# Patient Record
Sex: FEMALE | Race: BLACK OR AFRICAN AMERICAN | NOT HISPANIC OR LATINO | Employment: STUDENT | URBAN - METROPOLITAN AREA
[De-identification: names, ages, dates, MRNs, and addresses within clinical notes are randomized per-mention and may not be internally consistent; named-entity substitution may affect disease eponyms.]

---

## 2023-04-27 LAB
ALANINE AMINOTRANSFERASE (SGPT) (U/L) IN SER/PLAS: 16 U/L (ref 7–45)
ALBUMIN (G/DL) IN SER/PLAS: 4.4 G/DL (ref 3.4–5)
ALKALINE PHOSPHATASE (U/L) IN SER/PLAS: 33 U/L (ref 33–110)
ASPARTATE AMINOTRANSFERASE (SGOT) (U/L) IN SER/PLAS: 20 U/L (ref 9–39)
BILIRUBIN DIRECT (MG/DL) IN SER/PLAS: 0.1 MG/DL (ref 0–0.3)
BILIRUBIN TOTAL (MG/DL) IN SER/PLAS: 0.4 MG/DL (ref 0–1.2)
CALCIDIOL (25 OH VITAMIN D3) (NG/ML) IN SER/PLAS: 10 NG/ML
PROTEIN TOTAL: 7.5 G/DL (ref 6.4–8.2)
THYROTROPIN (MIU/L) IN SER/PLAS BY DETECTION LIMIT <= 0.05 MIU/L: 0.81 MIU/L (ref 0.44–3.98)
THYROXINE (T4) FREE (NG/DL) IN SER/PLAS: 1 NG/DL (ref 0.78–1.48)
TRIIODOTHYRONINE (T3) (NG/DL) IN SER/PLAS: 142 NG/DL (ref 60–200)

## 2023-04-28 LAB — PARATHYRIN INTACT (PG/ML) IN SER/PLAS: 29.1 PG/ML (ref 18.5–88)

## 2023-05-02 LAB — THYROID STIMULATING IMMUNOGLOBULIN: <1 TSI INDEX

## 2023-07-21 ENCOUNTER — HOSPITAL ENCOUNTER (OUTPATIENT)
Dept: DATA CONVERSION | Facility: HOSPITAL | Age: 28
End: 2023-07-23
Attending: OTOLARYNGOLOGY | Admitting: OTOLARYNGOLOGY
Payer: COMMERCIAL

## 2023-07-21 DIAGNOSIS — E04.9 NONTOXIC GOITER, UNSPECIFIED: ICD-10-CM

## 2023-07-21 DIAGNOSIS — E04.8 OTHER SPECIFIED NONTOXIC GOITER: ICD-10-CM

## 2023-07-21 LAB
ALBUMIN (G/DL) IN SER/PLAS: 4.2 G/DL (ref 3.4–5)
ANION GAP IN SER/PLAS: 17 MMOL/L (ref 10–20)
CALCIUM (MG/DL) IN SER/PLAS: 9.1 MG/DL (ref 8.6–10.6)
CARBON DIOXIDE, TOTAL (MMOL/L) IN SER/PLAS: 21 MMOL/L (ref 21–32)
CHLORIDE (MMOL/L) IN SER/PLAS: 103 MMOL/L (ref 98–107)
CREATININE (MG/DL) IN SER/PLAS: 0.63 MG/DL (ref 0.5–1.05)
GFR FEMALE: >90 ML/MIN/1.73M2
GLUCOSE (MG/DL) IN SER/PLAS: 144 MG/DL (ref 74–99)
PARATHYRIN INTACT (PG/ML) IN SER/PLAS: 46.4 PG/ML (ref 18.5–88)
PHOSPHATE (MG/DL) IN SER/PLAS: 4.3 MG/DL (ref 2.5–4.9)
POTASSIUM (MMOL/L) IN SER/PLAS: 4 MMOL/L (ref 3.5–5.3)
SODIUM (MMOL/L) IN SER/PLAS: 137 MMOL/L (ref 136–145)
UREA NITROGEN (MG/DL) IN SER/PLAS: 7 MG/DL (ref 6–23)

## 2023-07-22 LAB
ALBUMIN (G/DL) IN SER/PLAS: 3.8 G/DL (ref 3.4–5)
ANION GAP IN SER/PLAS: 14 MMOL/L (ref 10–20)
CALCIUM (MG/DL) IN SER/PLAS: 9 MG/DL (ref 8.6–10.6)
CARBON DIOXIDE, TOTAL (MMOL/L) IN SER/PLAS: 25 MMOL/L (ref 21–32)
CHLORIDE (MMOL/L) IN SER/PLAS: 104 MMOL/L (ref 98–107)
CREATININE (MG/DL) IN SER/PLAS: 0.59 MG/DL (ref 0.5–1.05)
ERYTHROCYTE DISTRIBUTION WIDTH (RATIO) BY AUTOMATED COUNT: 14.6 % (ref 11.5–14.5)
ERYTHROCYTE MEAN CORPUSCULAR HEMOGLOBIN CONCENTRATION (G/DL) BY AUTOMATED: 32.3 G/DL (ref 32–36)
ERYTHROCYTE MEAN CORPUSCULAR VOLUME (FL) BY AUTOMATED COUNT: 85 FL (ref 80–100)
ERYTHROCYTES (10*6/UL) IN BLOOD BY AUTOMATED COUNT: 4.34 X10E12/L (ref 4–5.2)
GFR FEMALE: >90 ML/MIN/1.73M2
GLUCOSE (MG/DL) IN SER/PLAS: 110 MG/DL (ref 74–99)
HEMATOCRIT (%) IN BLOOD BY AUTOMATED COUNT: 36.8 % (ref 36–46)
HEMOGLOBIN (G/DL) IN BLOOD: 11.9 G/DL (ref 12–16)
LEUKOCYTES (10*3/UL) IN BLOOD BY AUTOMATED COUNT: 9.2 X10E9/L (ref 4.4–11.3)
MAGNESIUM (MG/DL) IN SER/PLAS: 1.86 MG/DL (ref 1.6–2.4)
NRBC (PER 100 WBCS) BY AUTOMATED COUNT: 0 /100 WBC (ref 0–0)
PHOSPHATE (MG/DL) IN SER/PLAS: 3.3 MG/DL (ref 2.5–4.9)
PLATELETS (10*3/UL) IN BLOOD AUTOMATED COUNT: 303 X10E9/L (ref 150–450)
POTASSIUM (MMOL/L) IN SER/PLAS: 3.7 MMOL/L (ref 3.5–5.3)
SODIUM (MMOL/L) IN SER/PLAS: 139 MMOL/L (ref 136–145)
UREA NITROGEN (MG/DL) IN SER/PLAS: 6 MG/DL (ref 6–23)

## 2023-08-15 LAB
COMPLETE PATHOLOGY REPORT: NORMAL
CONVERTED CLINICAL DIAGNOSIS-HISTORY: NORMAL
CONVERTED FINAL DIAGNOSIS: NORMAL
CONVERTED FINAL REPORT PDF LINK TO COPY AND PASTE: NORMAL
CONVERTED GROSS DESCRIPTION: NORMAL

## 2023-09-29 VITALS — WEIGHT: 152.12 LBS | BODY MASS INDEX: 26.95 KG/M2 | HEIGHT: 63 IN

## 2023-09-30 NOTE — H&P
History of Present Illness:   Pregnant/Lactating:  ·  Are You Pregnant no   ·  Are You Currently Breastfeeding no     History Present Illness:  Reason for surgery: thyroid goiter   HPI:     large goiter  longstanding  ready to have it removed  Lengthy and detailed discussion was held with the patient regarding risks benefits and alternatives the thyroidectomy including the  risks to the recurrent laryngeal nerve to be either unilateral or bilateral partial or complete temporary or permanent and the  difference between unilateral paralysis as it relates to hoarseness and bilateral paralysis as it relates to potential breathing problems  and rare need for tracheotomy, also discussed hypocalcemia temporary or permanent basis as well as need for thyroid replacement  bleeding infection other perioperative risks including death based on medical comorbidities etc. patient expressed an understanding  consent has been obtained  discussed the surgical incisions and potential for scarring  [place] [facility] [place]inology  has seen endo  have discussed her anxiety and will talk with her family md to assist preop with  verbal consent obtained  will obtain written consent the day of surgery    Allergies:        Allergies:  ·  NKDA :   ·  Shell  Fish: Itching    Home Medication Review:   Home Medications Reviewed: yes     Impression/Procedure:   ·  Impression and Planned Procedure: large goiter plan is for total thyroidectomy       ERAS (Enhanced Recovery After Surgery):  ·  ERAS Patient: no       Physical Exam by System:    Constitutional: Well developed, awake/alert/oriented  x3, no distress, alert and cooperative   Eyes: PERRL, EOMI, clear sclera   ENMT: mucous membranes moist, no apparent injury,  no lesions seen   Head/Neck: Neck supple, no apparent injury, thyroid  without mass or tenderness, No JVD, trachea midline, no bruits   Respiratory/Thorax: Patent airways, CTAB, normal  breath sounds with good chest  expansion, thorax symmetric   Cardiovascular: Regular, rate and rhythm, no murmurs,  2+ equal pulses of the extremities, normal S 1and S 2   Musculoskeletal: ROM intact, no joint swelling, normal  strength   Extremities: normal extremities, no cyanosis edema,  contusions or wounds, no clubbing   Neurological: alert and oriented x3, intact senses,  motor, response and reflexes, normal strength   Lymphatic: No significant lymphadenopathy   Psychological: Appropriate mood and behavior   Skin: Warm and dry, no lesions, no rashes     Consent:   COVID-19 Consent:  ·  COVID-19 Risk Consent Surgeon has reviewed key risks related to the risk of frank COVID-19 and if they contract COVID-19 what the risks are.     Attestation:   Note Completion:  I am a:  Resident/Fellow   Attending Attestation I saw and evaluated the patient.  I personally obtained the key and critical portions of the history and physical exam or was physically present for key and  critical portions performed by the resident/fellow. I reviewed the resident/fellow?s documentation and discussed the patient with the resident/fellow.  I agree with the resident/fellow?s medical decision making as documented in the note.     I personally evaluated the patient on 21-Jul-2023         Electronic Signatures:  Darron Murillo (DO (Resident))  (Signed 21-Jul-2023 05:28)   Authored: History of Present Illness, Allergies, Home  Medication Review, Impression/Procedure, ERAS, Physical Exam, Consent, Note Completion  Paul Valdez)  (Signed 21-Jul-2023 12:22)   Authored: Note Completion   Co-Signer: History of Present Illness, Allergies, Home Medication Review, Impression/Procedure, ERAS, Physical Exam, Consent, Note Completion      Last Updated: 21-Jul-2023 12:22 by Paul Valdez)

## 2023-09-30 NOTE — PROGRESS NOTES
Service: ENT     Subjective Data:   ALBERTO TELLEZ is a 27 year old Female who is Hospital Day # 3 and POD #2 for 1. total thyroidectomy ;2. parathyroid reimplantation.    Additional Information:    Subjective:  No acute events overnight    Objective:  Vitals reviewed  General: Alert,no acute distress  Resp: Breathing comfortably on room air, no stridor  Head: Atraumatic, normocephalic  Oral Cavity: MMM  Ears: Normal external ears  Nose: Normal external nose  Neck: Incision c/d/i, no evidence of fluid collection, drain in place, serosanguinous, steri strips    Assessment:  28 yo F with PMH of large multinodular goiter s/p total thyroidectomy with parathyroid reimplantation 7/21 with Dr. Valdez. PACU PTH 46.4.    Active Issues:  anxiety  s/p thyroidectomy    Plan:  ENT: f/u drain output  Neuro/Pain: tylenol, oxy PRN  CV: q4 VS  Pulm: q4 VS  GI: regular diet, bowel reg  : no ind for rodriguez  Heme:  f/u H/H  ID: no ind for abx  Endo: f/u BG and AM labs  MSK: OOB ad kate  Dispo:  pending drain    Dept. of Otolaryngology - Head and Neck Surgery  ENT Adult: [id number]  ENT Phone: [phone]  ENT Peds: [phone]  ENT Outpatient scheduling number: [phone]     Objective Data:     Objective Information:      T   P  R  BP   MAP  SpO2   Value  36.9  77  18  130/88      95%  Date/Time 7/23 5:14 7/23 5:14 7/23 5:14 7/23 5:14    7/23 5:14  Range  (36.2C - 37C )  (76 - 100 )  (16 - 18 )  (117 - 130 )/ (77 - 88 )    (94% - 98% )  Highest temp of 37 C was recorded at 7/23 1:23      Pain reported at 7/23 5:02: 8 = Severe    ---- Intake and Output  -----  Mn/Dy/Year Time  Intake   Output  Net  Jul 23, 2023 6:00 am  0   825  -825  Jul 22, 2023 10:00 pm  325   802.5  -478  Jul 22, 2023 2:00 pm  0   15  -15    The Intake and Output Totals for the last 24 hours are:      Intake   Output  Net      325   1642  -1318    Recent Lab Results:    Results:        RFP: 7/21/2023 19:24  NA+        Cl-     BUN  /                         137     103    7  /  --------------------------------  Glucose                ---------------------------  144 H    K+     HCO3-   Creat \                         4.0    21    0.63  \  Calcium : 9.1Anion Gap : 17          Albumin : 4.2     Phos : 4.3      Assessment and Plan:   Code Status:  ·  Code Status Full Code     Attestation:   Note Completion:  I am a:  Resident/Fellow   Attending Attestation I saw and evaluated the patient.  I personally obtained the key and critical portions of the history and physical exam or was physically present for key and  critical portions performed by the resident/fellow. I reviewed the resident/fellow?s documentation and discussed the patient with the resident/fellow.  I agree with the resident/fellow?s medical decision making as documented in the note.     I personally evaluated the patient on 23-Jul-2023         Electronic Signatures:  Ryan Wang (Resident))  (Signed 23-Jul-2023 08:38)   Authored: Service, Subjective Data, Objective Data, Assessment  and Plan, Note Completion  Jean Goldsmith)  (Signed 26-Jul-2023 09:31)   Authored: Note Completion   Co-Signer: Service, Subjective Data, Objective Data, Assessment and Plan, Note Completion      Last Updated: 26-Jul-2023 09:31 by Jean Goldsmith)

## 2023-09-30 NOTE — DISCHARGE SUMMARY
Send Summary:   Discharge Summary Providers:  Provider Role Provider Name   · Attending Paul Valdez   · Referring Paul Valdez   · Primary Dara Gupta       Note Recipients: Dara Gupta MD Rezaee, Rod, MD - [phone] [preferred]       Discharge:    Summary:   Admission Date: .21-Jul-2023 10:46:00   Discharge Date: 23-Jul-2023   Attending Physician at Discharge: Paul Valdez   Admission Reason: goiter   Final Discharge Diagnoses: Goiter   Procedures: Date: 21-Jul-2023 18:32:00  Procedure Name: 1. total thyroidectomy   2. parathyroid reimplantation   Condition at Discharge: Satisfactory   Disposition at Discharge: .Home   Hospital Course:    Les is a 28 yo F who underwent total thyroidectomy on 7/21/23 with Dr. Valdez. Please see operative report for full details. They tolerated the procedure well  and was admitted to the regular nursing floor post-op. The post-op PTH was 46 and Ca 9.1. Pain was well controlled on oral pain medication and patient was tolerated regular diet. The drain was monitored and removed prior to discharge. At this time, patient  was determined to be safe for discharge home with outpatient follow-up.       Discharge Information:    and Continuing Care:   Lab Results - Pending:    Surgical Pathology Drawn at 21-Jul-2023 16:51:00  Radiology Results - Pending: None   Discharge Instructions:    Activity:           activity as tolerated.          May shower..            May not return to school/work until follow-up visit with.  Dr. Valdez            May not drive while taking narcotics.            No pushing, pulling, or lifting objects greater than 10 pounds.      Nutrition/Diet:           regular    Additional Orders:           Additional Instructions:   Monitor for signs of low calcium  ?Tingling in your hands, feet, or lips ?Muscle spasms, weakness, shaking, or loss of body control ?Seizures ?Slow or uneven heartbeat, lightheadedness ?Anxiety,  depression, anger, confusion, or seeing or hearing things  that are not really there     if you develop these symptoms - take 2-3 TUMS tablets and call Dr. melendrez's office during business hours or go to nearest ER.    Follow Up Appointments:    Follow-Up Appointment 01:           Physician/Dept/Service:   Dr. Melendrez          Reason for Referral:   post op appointment          Scheduled Date/Time:   01-Aug-2023 13:45          Location:   [facility] [address]  [address], [place] [address]          Phone Number:   [phone] with questions    Discharge Medications: Home Medication   NuvaRing 0.12 mg-0.015 mg/24 hours vaginal ring - 1 each vaginal every 4 weeks  levothyroxine 112 mcg (0.112 mg) oral tablet - 1 tab(s) orally once a day - take on empty stomach at least 1 hour before breakfast  oxyCODONE 5 mg/5 mL oral solution - 5 milliliter(s) orally every 6 hours   Senna S 50 mg-8.6 mg oral tablet - 2 tab(s) orally once a day (at bedtime) while taking naroctics   citalopram 20 mg oral tablet - 1 tab(s) oral once a day  Vitamin D3 1250 mcg oral capsule - 1 cap(s) oral once a week     PRN Medication   LORazepam 0.5 mg oral tablet - 1 tab(s) orally once a day, As Needed - for anxiety    Last filled 5/6/22 for 10 days.  loratadine 10 mg oral tablet - 1 tab(s) orally once a day, As Needed - for allergy symptoms     DNR Status:   ·  Code Status Code Status order at time of discharge: Full Code     Attestation:   Note Completion:  I am a:  Resident/Fellow   Attending Attestation I reviewed the resident/fellow?s documentation and discussed the patient with the resident/fellow.  I agree with the resident/fellow?s medical  decision making as documented in the note.          Electronic Signatures:  Ryan Wang (Resident))  (Signed 23-Jul-2023 09:19)   Authored: Send Summary, Summary Content, Ongoing Care,  DNR Status, Note Completion  Paul Melendrez)  (Signed 23-Jul-2023  12:06)   Authored: Note Completion   Co-Signer: Send Summary, Summary Content, Ongoing Care, DNR Status, Note Completion      Last Updated: 23-Jul-2023 12:06 by Paul Valdez)

## 2023-09-30 NOTE — PROGRESS NOTES
Service: ENT     Subjective Data:   ALBERTO TELLEZ is a 27 year old Female who is Hospital Day # 2 and POD #1 for 1. total thyroidectomy ;2. parathyroid reimplantation.    Additional Information:    Subjective:  No acute events overnight  Patient sluggish, feels pain is adequately managed    Objective:  Vitals reviewed  General: Alert,no acute distress  Resp: Breathing comfortably on room air, no stridor  Head: Atraumatic, normocephalic  Oral Cavity: MMM  Ears: Normal external ears  Nose: Normal external nose  Neck: Incision c/d/i, no evidence of fluid collection, drain in place, serosanguinous, steri strips    Assessment:  26 yo F with PMH of large multinodular goiter s/p total thyroidectomy with parathyroid reimplantation 7/21 with Dr. Valdez. PACU PTH 46.4.    Active Issues:  anxiety  s/p thyroidectomy    Plan:  ENT: f/u drain output  Neuro/Pain: tylenol, oxy PRN  CV: q4 VS  Pulm: q4 VS  GI: regular diet, bowel reg  : no ind for rodriguez  Heme:  f/u H/H  ID: no ind for abx  Endo: f/u BG and AM labs  MSK: OOB ad kate  Dispo:  pending drain      Dept. of Otolaryngology - Head and Neck Surgery  ENT Adult: [id number]  ENT Phone: [phone]  ENT Peds: [phone]  ENT Outpatient scheduling number: [phone]     Objective Data:     Objective Information:      T   P  R  BP   MAP  SpO2   Value  36.6  89  18  122/79      93%  Date/Time 7/21 22:19 7/21 22:19 7/21 22:19 7/21 22:19    7/21 22:19  Range  (36.6C - 36.6C )  (89 - 89 )  (18 - 18 )  (122 - 122 )/ (79 - 79 )    (93% - 93% )      Pain reported at 7/21 21:45: sleeping    Recent Lab Results:    Results:        RFP: 7/21/2023 19:24  NA+        Cl-     BUN  /                         137    103    7  /  --------------------------------  Glucose                ---------------------------  144 H    K+     HCO3-   Creat \                         4.0    21    0.63  \  Calcium : 9.1Anion Gap : 17          Albumin : 4.2     Phos : 4.3      Assessment and Plan:   Code  Status:  ·  Code Status Full Code     Attestation:   Note Completion:  I am a:  Resident/Fellow   Attending Attestation I saw and evaluated the patient.  I personally obtained the key and critical portions of the history and physical exam or was physically present for key and  critical portions performed by the resident/fellow. I reviewed the resident/fellow?s documentation and discussed the patient with the resident/fellow.  I agree with the resident/fellow?s medical decision making as documented in the note.     I personally evaluated the patient on 22-Jul-2023         Electronic Signatures:  Kip Edwards)  (Signed 23-Jul-2023 10:01)   Authored: Note Completion   Co-Signer: Service, Subjective Data, Objective Data, Assessment and Plan, Note Completion  Sanket De Jesus (MD (Resident))  (Signed 22-Jul-2023 00:50)   Authored: Service, Subjective Data, Objective Data, Assessment  and Plan, Note Completion  Marvel Dyson (Resident))  (Signed 23-Jul-2023 00:07)   Entered: Subjective Data   Authored: Service, Subjective Data, Objective Data, Assessment and Plan, Note Completion      Last Updated: 23-Jul-2023 10:01 by Kip Edwards)

## 2023-10-02 NOTE — OP NOTE
PROCEDURE DETAILS    Preoperative Diagnosis:  substernal goiter   Postoperative Diagnosis:  substernal goiter   Surgeon: Courtney   Resident/Fellow/Other Assistant: Hipolito    Procedure:  1. total thyroidectomy   2. parathyroid reimplantation   Anesthesia: general  Estimated Blood Loss: 35 cc   Findings: large goiter with primarily anterior extension  b/l RLN identified and stimulated at end of case   Specimens(s) Collected: yes,  thyroid lobe    Complications: none   Drains and/or Catheters: 1 TIM in right neck  Patient Returned To/Condition: pacu/stable         Operative Report:   Indications:  Patient is a 28 yo F who has been following us in clinic for quite some time for a large thyroid goiter. Her anxiety has been limiting her decision to pursue surgery. However, in the recent months the goiter has continued to enlarge and now is causing  significant symptomatic burden including dysphagia, limitation in neck motion, and inability to lay fat 2/2 to pressure. Given the progressive nature of symptoms and after mangement of anxiety with her PCP, she was agreeable to undergoing thyroidectomy.  Extensive discussions had with patient and her mother regarding the risks, benefits, and alternatives to the procedures in clinic on multiple occasions. Risks reviewed are but not limited to bleeding, infection, injury (permanent or temporary) to the  RLN, hypoparathyroidism, voice changes. She was understanding and agreeable to the procedure and risks, she signed informed consent.     Description of Procedure:  Patient was seen and evaluated in the pre-operative area. Informed consent was obtained as described above. Patient was taken back to the operating room by the anesthesia team. General anesthesia was induced and patient was orotracheally intubated using  a Glidescope and a nerve monitoring tube. Appropriate position was confirmed by anesthesia and ENT. The nerve monitor was hooked up and confirmed to be working  appropriately. This was used throughout the case to identify and ensure function of the bilateral  recurrent laryngeal nerves. Pre-operative huddle was performed by Dr. Valdez .    The bulk of the goiter was extending anteriorly. We planned a 9-10 cm incision low in the midline neck, injected this with 1% 1:000,000 lidocaine with epinephrine. She was then prepped and draped in appropriate fashion with a shoulder roll in place. A  #15 blade was used to incise the skin. Electrocautery was used to incise down to the anterior jugular veins.  Subplatysmal flaps were elevated superiorly and inferiorly. The strap muscles were identified, noted to be significantly thinned due to the goiter,  and , elevated, and divided bilaterally. Retractors were placed. We immediately encountered the goiter comprising the left thyroid lobe and isthmus. We then proceeded with a capsular dissection around the entirety of the goiter freeing the bulk  of the tissue from the fascia and surrounding soft tissue. We then moved medially and identified the trachea superiorly and inferiorly  which had been deviated to the right. The isthmus was identified however the goiter was comprising the majority of  the isthmus. We were then able to deliver the bulk of the goiter from the neck incision improving visibility of the deep neck. We then mobilized the superior pole vessels and isolated and clipped the vessels. A Ora was placed on the superior pole  being careful not to rupture any nodules/masses, as we mobilized the superior pole further. It was retracted down and all the soft tissue down to the joint space was freed. Laterally, the middle thyroid vein was clipped and inferiorly we identified the  vessels and these were clipped as well. We identified the left RLN at the joint space given the large goiter and risk of displacement. We traced the Left RLN inferiorly and released the thyroid gland from the RLN until the gland was swept away  from the  nerve. Elias's ligament was divided. The RLN was then stimulated appropriately.     We then turned our attention to the contralateral side. The strep muscles had already been divided. We again performed a capsular dissection around the gland and swept the fascia away freeing the gland from the soft tissue. We then mobilized the superior  pole. Vessels were identified, isolated, clipped, and cut. A [place] was placed on the superior pole being careful not to rupture any nodules/masses, as we mobilized the superior pole further. It was retracted down and all the soft tissue down to the  joint space was freed. We then identified the lateral aspect of the gland and the middle thyroid vein which was isolated, clipped, and cut. The inferior pole vessels were isolated, clipped, and cut. The fascia was swept off the gland. The trachea had  already been identified. We again found the right RLN at the joint space and followed this inferiorly dissecting the gland off of the RLN until it was freed. The right RLN stimulated at the end of the dissection. At this point, the tracheal attachments  of the isthmus were the only remaining aspect and this was freed. The specimen was removed, oriented, and sent for permanent pathology.     We did identify the right superior parathyroid gland, it appeared slightly devascularized therefore we moved forward with reimplantation. The gland was removed, minced with a Metzenbaum scissors and reimplanted into a pocket created in the bulk of the  R SCM muscle. The muscle was reapproximated with a 3-0 vicryl suture.     We then performed extensive irrigation and hemostasis. bilateral nerves were stimulated again. We placed a single 10-flat drain and secured this with a 2-0 prolene suture. WE turned our attention to closure. The straps were re-approximated. The platysma  was closed with 3-0 vicryl, and then the deep dermal layer with 3-0 vicryl and a running subcuticular with 4-0 monocryl  for the skin. Mastisol and Steri-Strips were placed. Patient's care was then turned over to the anesthesia team and they were awaken,  extubated and transported to the PACU in stable condition.    Dr. Valdez was present and participated in the critical portions of the procedure..                        Attestation:   Note Completion:  Attending Attestation I was present for the entire procedure    I am a: Resident/Fellow         Electronic Signatures:  Cathy Mcmillan (Resident))  (Signed 23-Jul-2023 10:52)   Authored: Post-Operative Note, Chart Review, Note Completion  Paul Valdez)  (Signed 23-Jul-2023 12:06)   Authored: Note Completion   Co-Signer: Post-Operative Note, Chart Review, Note Completion      Last Updated: 23-Jul-2023 12:06 by Paul Valdez)

## 2023-11-06 PROBLEM — E07.9 THYROID DISORDER: Status: ACTIVE | Noted: 2023-11-06

## 2023-11-06 PROBLEM — L83 ACANTHOSIS NIGRICANS: Status: ACTIVE | Noted: 2023-11-06

## 2023-11-06 PROBLEM — R13.10 DYSPHAGIA: Status: ACTIVE | Noted: 2023-11-06

## 2023-11-06 PROBLEM — N63.0 BREAST MASS IN FEMALE: Status: ACTIVE | Noted: 2023-11-06

## 2023-11-06 PROBLEM — E55.9 VITAMIN D DEFICIENCY: Status: ACTIVE | Noted: 2023-11-06

## 2023-11-06 PROBLEM — E04.9 GOITER: Status: ACTIVE | Noted: 2023-11-06

## 2023-11-06 PROBLEM — R79.89 LOW TSH LEVEL: Status: ACTIVE | Noted: 2023-11-06

## 2023-11-06 PROBLEM — M54.2 CERVICALGIA: Status: ACTIVE | Noted: 2023-11-06

## 2023-11-06 PROBLEM — F41.9 ANXIETY: Status: ACTIVE | Noted: 2023-11-06

## 2023-11-06 RX ORDER — LORATADINE 10 MG/1
1 TABLET ORAL DAILY PRN
COMMUNITY
Start: 2023-04-27

## 2023-11-06 RX ORDER — CYCLOBENZAPRINE HCL 5 MG
1 TABLET ORAL 3 TIMES DAILY
COMMUNITY
Start: 2023-07-25

## 2023-11-06 RX ORDER — TRAMADOL HYDROCHLORIDE 50 MG/1
50-100 TABLET ORAL EVERY 4 HOURS PRN
COMMUNITY
Start: 2023-07-25

## 2023-11-06 RX ORDER — LORAZEPAM 0.5 MG/1
1 TABLET ORAL DAILY PRN
COMMUNITY
Start: 2022-05-02

## 2023-11-06 RX ORDER — CITALOPRAM 20 MG/1
1 TABLET, FILM COATED ORAL DAILY
COMMUNITY
Start: 2022-05-06

## 2023-11-06 RX ORDER — HYDROXYZINE PAMOATE 25 MG/1
1 CAPSULE ORAL 3 TIMES DAILY PRN
COMMUNITY
Start: 2019-10-22

## 2023-11-06 RX ORDER — ETONOGESTREL AND ETHINYL ESTRADIOL .12; .015 MG/D; MG/D
RING VAGINAL
COMMUNITY
Start: 2023-06-15

## 2023-11-06 RX ORDER — ACETAMINOPHEN 500 MG
2000 TABLET ORAL DAILY
COMMUNITY
Start: 2023-05-03

## 2023-11-06 RX ORDER — ERGOCALCIFEROL 1.25 MG/1
50000 CAPSULE ORAL
COMMUNITY
Start: 2023-05-03

## 2024-02-05 ENCOUNTER — OFFICE VISIT (OUTPATIENT)
Dept: PRIMARY CARE | Facility: CLINIC | Age: 29
End: 2024-02-05
Payer: COMMERCIAL

## 2024-02-05 ENCOUNTER — APPOINTMENT (OUTPATIENT)
Dept: PRIMARY CARE | Facility: CLINIC | Age: 29
End: 2024-02-05
Payer: COMMERCIAL

## 2024-02-05 ENCOUNTER — LAB (OUTPATIENT)
Dept: LAB | Facility: LAB | Age: 29
End: 2024-02-05
Payer: COMMERCIAL

## 2024-02-05 VITALS
SYSTOLIC BLOOD PRESSURE: 112 MMHG | BODY MASS INDEX: 25.9 KG/M2 | TEMPERATURE: 98 F | WEIGHT: 145.6 LBS | DIASTOLIC BLOOD PRESSURE: 76 MMHG | HEART RATE: 76 BPM

## 2024-02-05 DIAGNOSIS — Z13.220 SCREENING CHOLESTEROL LEVEL: ICD-10-CM

## 2024-02-05 DIAGNOSIS — Z13.1 DIABETES MELLITUS SCREENING: ICD-10-CM

## 2024-02-05 DIAGNOSIS — E04.9 GOITER: ICD-10-CM

## 2024-02-05 DIAGNOSIS — Z23 IMMUNIZATION DUE: ICD-10-CM

## 2024-02-05 DIAGNOSIS — Z11.4 ENCOUNTER FOR SCREENING FOR HIV: ICD-10-CM

## 2024-02-05 DIAGNOSIS — Z00.00 HEALTH CARE MAINTENANCE: ICD-10-CM

## 2024-02-05 DIAGNOSIS — Z11.59 NEED FOR HEPATITIS C SCREENING TEST: ICD-10-CM

## 2024-02-05 DIAGNOSIS — Z00.00 HEALTH CARE MAINTENANCE: Primary | ICD-10-CM

## 2024-02-05 LAB
ERYTHROCYTE [DISTWIDTH] IN BLOOD BY AUTOMATED COUNT: 14.8 % (ref 11.5–14.5)
HCT VFR BLD AUTO: 39.5 % (ref 36–46)
HGB BLD-MCNC: 13 G/DL (ref 12–16)
MCH RBC QN AUTO: 28.2 PG (ref 26–34)
MCHC RBC AUTO-ENTMCNC: 32.9 G/DL (ref 32–36)
MCV RBC AUTO: 86 FL (ref 80–100)
MEV IGG SER QL IA: NEGATIVE
MUMPS IGG ANTIBODY INDEX: 1 IA
MUV IGG SER IA-ACNC: ABNORMAL
NRBC BLD-RTO: 0 /100 WBCS (ref 0–0)
PLATELET # BLD AUTO: 264 X10*3/UL (ref 150–450)
RBC # BLD AUTO: 4.61 X10*6/UL (ref 4–5.2)
RUBEOLA IGG ANTIBODY INDEX: 0.5 IA
RUBV IGG SERPL IA-ACNC: 2 IA
RUBV IGG SERPL QL IA: POSITIVE
T4 FREE SERPL-MCNC: 0.8 NG/DL (ref 0.78–1.48)
TSH SERPL-ACNC: 25.84 MIU/L (ref 0.44–3.98)
WBC # BLD AUTO: 5.8 X10*3/UL (ref 4.4–11.3)

## 2024-02-05 PROCEDURE — 99395 PREV VISIT EST AGE 18-39: CPT | Performed by: FAMILY MEDICINE

## 2024-02-05 PROCEDURE — 86803 HEPATITIS C AB TEST: CPT

## 2024-02-05 PROCEDURE — 86765 RUBEOLA ANTIBODY: CPT

## 2024-02-05 PROCEDURE — 86735 MUMPS ANTIBODY: CPT

## 2024-02-05 PROCEDURE — 36415 COLL VENOUS BLD VENIPUNCTURE: CPT

## 2024-02-05 PROCEDURE — 80061 LIPID PANEL: CPT

## 2024-02-05 PROCEDURE — 87389 HIV-1 AG W/HIV-1&-2 AB AG IA: CPT

## 2024-02-05 PROCEDURE — 86317 IMMUNOASSAY INFECTIOUS AGENT: CPT

## 2024-02-05 PROCEDURE — 84439 ASSAY OF FREE THYROXINE: CPT

## 2024-02-05 PROCEDURE — 83036 HEMOGLOBIN GLYCOSYLATED A1C: CPT

## 2024-02-05 PROCEDURE — 80053 COMPREHEN METABOLIC PANEL: CPT

## 2024-02-05 PROCEDURE — 84443 ASSAY THYROID STIM HORMONE: CPT

## 2024-02-05 PROCEDURE — 1036F TOBACCO NON-USER: CPT | Performed by: FAMILY MEDICINE

## 2024-02-05 PROCEDURE — 86481 TB AG RESPONSE T-CELL SUSP: CPT

## 2024-02-05 PROCEDURE — 85027 COMPLETE CBC AUTOMATED: CPT

## 2024-02-05 ASSESSMENT — PAIN SCALES - GENERAL: PAINLEVEL: 0-NO PAIN

## 2024-02-05 NOTE — ASSESSMENT & PLAN NOTE
-pap smear due for 3/14/2024.  Scheduled to se a GYN.  -blood test due.  Ordered.  -MMR titers ordered.  -Tdap vaccine sent to her pharmacy for insurance reasons.

## 2024-02-05 NOTE — PROGRESS NOTES
Subjective   Patient ID: Les Workman is a 28 y.o. female who presents for Immunizations and CPE.  HPI    The patient is a 27 yo AA female with a history of a massive goiter with compressive symptoms s/p total thyroidectomy (7/21/2023), anxiety, here for:  1- CPE.  -pap smear due for 3/14/2024.  -blood test due.  -MMR titers ordered.  -Tdap vaccine sent to her pharmacy for insurance reasons.    2- Blood titers for the employer.     A review of system was completed.  All systems were reviewed and were normal, except for the ones that are listed in the HPI.    Objective   Physical Exam  Constitutional:       Appearance: Normal appearance.   HENT:      Head: Normocephalic and atraumatic.      Right Ear: Tympanic membrane, ear canal and external ear normal.      Left Ear: Tympanic membrane, ear canal and external ear normal.      Nose: Nose normal.      Mouth/Throat:      Mouth: Mucous membranes are moist.      Pharynx: Oropharynx is clear.   Eyes:      Extraocular Movements: Extraocular movements intact.      Conjunctiva/sclera: Conjunctivae normal.      Pupils: Pupils are equal, round, and reactive to light.   Cardiovascular:      Rate and Rhythm: Normal rate and regular rhythm.      Pulses: Normal pulses.   Pulmonary:      Effort: Pulmonary effort is normal.      Breath sounds: Normal breath sounds.   Abdominal:      General: Abdomen is flat. Bowel sounds are normal.      Palpations: Abdomen is soft.   Musculoskeletal:         General: Normal range of motion.      Cervical back: Normal range of motion and neck supple.   Skin:     General: Skin is warm.   Neurological:      General: No focal deficit present.      Mental Status: She is alert and oriented to person, place, and time. Mental status is at baseline.   Psychiatric:         Mood and Affect: Mood normal.         Behavior: Behavior normal.         Thought Content: Thought content normal.         Judgment: Judgment normal.         Assessment/Plan   Problem  List Items Addressed This Visit       Goiter    Relevant Orders    CBC (Completed)    Comprehensive Metabolic Panel (Completed)    TSH with reflex to Free T4 if abnormal (Completed)    Health care maintenance - Primary     -pap smear due for 3/14/2024.  Scheduled to se a GYN.  -blood test due.  Ordered.  -MMR titers ordered.  -Tdap vaccine sent to her pharmacy for insurance reasons.           Relevant Orders    T-Spot TB    Immunization due    Relevant Medications    measles-mumps-rubella-varicella (Proquad) 66nds7-1.3-3- 3.99 TCID50/0.5 suspension for reconstitution vaccine injection    Other Relevant Orders    Mumps Antibody, IgG (Completed)    Rubella Antibody, IgG (Completed)    Rubeola Antibody, IgG (Completed)    T-Spot TB    Screening cholesterol level    Relevant Orders    Lipid Panel (Completed)    Diabetes mellitus screening    Relevant Orders    Hemoglobin A1C (Completed)     Other Visit Diagnoses       Encounter for screening for HIV        Relevant Orders    HIV 1/2 Antigen/Antibody Screen with Reflex to Confirmation (Completed)    Need for hepatitis C screening test        Relevant Orders    Hepatitis C Antibody (Completed)         Patient to return to office in 12 months for your next CPE.

## 2024-02-06 ENCOUNTER — PATIENT MESSAGE (OUTPATIENT)
Dept: PRIMARY CARE | Facility: CLINIC | Age: 29
End: 2024-02-06
Payer: COMMERCIAL

## 2024-02-06 DIAGNOSIS — Z23 IMMUNIZATION DUE: Primary | ICD-10-CM

## 2024-02-06 LAB
ALBUMIN SERPL BCP-MCNC: 4.5 G/DL (ref 3.4–5)
ALP SERPL-CCNC: 41 U/L (ref 33–110)
ALT SERPL W P-5'-P-CCNC: 17 U/L (ref 7–45)
ANION GAP SERPL CALC-SCNC: 13 MMOL/L (ref 10–20)
AST SERPL W P-5'-P-CCNC: 20 U/L (ref 9–39)
BILIRUB SERPL-MCNC: 0.3 MG/DL (ref 0–1.2)
BUN SERPL-MCNC: 15 MG/DL (ref 6–23)
CALCIUM SERPL-MCNC: 9.7 MG/DL (ref 8.6–10.6)
CHLORIDE SERPL-SCNC: 105 MMOL/L (ref 98–107)
CHOLEST SERPL-MCNC: 209 MG/DL (ref 0–199)
CHOLESTEROL/HDL RATIO: 2.8
CO2 SERPL-SCNC: 26 MMOL/L (ref 21–32)
CREAT SERPL-MCNC: 0.99 MG/DL (ref 0.5–1.05)
EGFRCR SERPLBLD CKD-EPI 2021: 80 ML/MIN/1.73M*2
EST. AVERAGE GLUCOSE BLD GHB EST-MCNC: 97 MG/DL
GLUCOSE SERPL-MCNC: 92 MG/DL (ref 74–99)
HBA1C MFR BLD: 5 %
HCV AB SER QL: NONREACTIVE
HDLC SERPL-MCNC: 74.5 MG/DL
HIV 1+2 AB+HIV1 P24 AG SERPL QL IA: NONREACTIVE
LDLC SERPL CALC-MCNC: 106 MG/DL
NON HDL CHOLESTEROL: 135 MG/DL (ref 0–149)
POTASSIUM SERPL-SCNC: 3.8 MMOL/L (ref 3.5–5.3)
PROT SERPL-MCNC: 7.9 G/DL (ref 6.4–8.2)
SODIUM SERPL-SCNC: 140 MMOL/L (ref 136–145)
TRIGL SERPL-MCNC: 144 MG/DL (ref 0–149)
VLDL: 29 MG/DL (ref 0–40)

## 2024-02-07 ENCOUNTER — TELEPHONE (OUTPATIENT)
Dept: PRIMARY CARE | Facility: CLINIC | Age: 29
End: 2024-02-07

## 2024-02-07 LAB
NIL(NEG) CONTROL SPOT COUNT: NORMAL
PANEL A SPOT COUNT: 0
PANEL B SPOT COUNT: 0
POS CONTROL SPOT COUNT: NORMAL
T-SPOT. TB INTERPRETATION: NEGATIVE

## 2024-02-29 ENCOUNTER — APPOINTMENT (OUTPATIENT)
Dept: PRIMARY CARE | Facility: CLINIC | Age: 29
End: 2024-02-29
Payer: COMMERCIAL

## 2024-03-14 ENCOUNTER — APPOINTMENT (OUTPATIENT)
Dept: OBSTETRICS AND GYNECOLOGY | Facility: CLINIC | Age: 29
End: 2024-03-14
Payer: COMMERCIAL

## 2024-03-21 ENCOUNTER — APPOINTMENT (OUTPATIENT)
Dept: OBSTETRICS AND GYNECOLOGY | Facility: CLINIC | Age: 29
End: 2024-03-21
Payer: COMMERCIAL

## 2024-04-01 RX ORDER — AMOXICILLIN 250 MG
CAPSULE ORAL
Qty: 14 TABLET | Refills: 0 | OUTPATIENT
Start: 2024-04-01 | End: 2025-04-01

## 2024-05-01 DIAGNOSIS — R79.89 LOW TSH LEVEL: Primary | ICD-10-CM

## 2024-05-01 RX ORDER — LEVOTHYROXINE SODIUM 112 UG/1
TABLET ORAL
Qty: 90 TABLET | Refills: 1 | Status: SHIPPED | OUTPATIENT
Start: 2024-05-01 | End: 2025-05-01

## 2024-05-24 ENCOUNTER — OFFICE VISIT (OUTPATIENT)
Dept: PRIMARY CARE | Facility: CLINIC | Age: 29
End: 2024-05-24
Payer: COMMERCIAL

## 2024-05-24 VITALS
TEMPERATURE: 97.7 F | SYSTOLIC BLOOD PRESSURE: 112 MMHG | DIASTOLIC BLOOD PRESSURE: 73 MMHG | HEART RATE: 96 BPM | WEIGHT: 153 LBS | BODY MASS INDEX: 27.21 KG/M2

## 2024-05-24 DIAGNOSIS — N76.0 ACUTE VAGINITIS: ICD-10-CM

## 2024-05-24 DIAGNOSIS — Z12.4 PAP SMEAR FOR CERVICAL CANCER SCREENING: Primary | ICD-10-CM

## 2024-05-24 PROCEDURE — 87591 N.GONORRHOEAE DNA AMP PROB: CPT

## 2024-05-24 PROCEDURE — 87661 TRICHOMONAS VAGINALIS AMPLIF: CPT

## 2024-05-24 PROCEDURE — 99214 OFFICE O/P EST MOD 30 MIN: CPT | Performed by: FAMILY MEDICINE

## 2024-05-24 PROCEDURE — 88142 CYTOPATH C/V THIN LAYER: CPT

## 2024-05-24 PROCEDURE — 1036F TOBACCO NON-USER: CPT | Performed by: FAMILY MEDICINE

## 2024-05-24 PROCEDURE — 87491 CHLMYD TRACH DNA AMP PROBE: CPT

## 2024-05-24 ASSESSMENT — PAIN SCALES - GENERAL: PAINLEVEL: 0-NO PAIN

## 2024-05-24 NOTE — PROGRESS NOTES
Subjective   Patient ID: Les Workman is a 28 y.o. female who presents for Annual Exam (CPE pap included).  HPI    The patient is a 29 yo AA female with a history of a massive goiter with compressive symptoms s/p total thyroidectomy (7/21/2023), anxiety, here for:     1- vaginitis.  2- pap smear Only.    A review of system was completed.  All systems were reviewed and were normal, except for the ones that are listed in the HPI.    Objective   Physical Exam  Genitourinary:     General: Normal vulva.      Rectum: Normal.      Comments: Pap smear done today.    Assessment/Plan   Problem List Items Addressed This Visit       Pap smear for cervical cancer screening - Primary     -Pap smear and STD screening done today.          Relevant Orders    THINPREP PAP TEST    Acute vaginitis     -STD screening ordered thru pap smear.         Relevant Orders    THINPREP PAP TEST      Patient to return to office if not better within a week.

## 2024-05-28 LAB
C TRACH RRNA SPEC QL NAA+PROBE: NEGATIVE
N GONORRHOEA DNA SPEC QL PROBE+SIG AMP: NEGATIVE
T VAGINALIS RRNA SPEC QL NAA+PROBE: NEGATIVE

## 2024-05-30 LAB
CYTOLOGY CMNT CVX/VAG CYTO-IMP: NORMAL
LAB AP HPV GENOTYPE QUESTION: YES
LAB AP HPV HR: NORMAL
LAB AP PAP ADDITIONAL TESTS: NORMAL
LABORATORY COMMENT REPORT: NORMAL
LABORATORY COMMENT REPORT: NORMAL
LMP START DATE: NORMAL
PATH REPORT.TOTAL CANCER: NORMAL

## 2024-07-02 ENCOUNTER — APPOINTMENT (OUTPATIENT)
Dept: PRIMARY CARE | Facility: CLINIC | Age: 29
End: 2024-07-02
Payer: COMMERCIAL

## 2024-07-17 ENCOUNTER — APPOINTMENT (OUTPATIENT)
Dept: OBSTETRICS AND GYNECOLOGY | Facility: CLINIC | Age: 29
End: 2024-07-17
Payer: COMMERCIAL

## 2024-08-28 ENCOUNTER — APPOINTMENT (OUTPATIENT)
Dept: RADIOLOGY | Facility: CLINIC | Age: 29
End: 2024-08-28
Payer: COMMERCIAL

## 2024-08-29 ENCOUNTER — HOSPITAL ENCOUNTER (OUTPATIENT)
Dept: RADIOLOGY | Facility: CLINIC | Age: 29
Discharge: HOME | End: 2024-08-29
Payer: COMMERCIAL

## 2024-08-29 DIAGNOSIS — N63.10 LUMP OF RIGHT BREAST: ICD-10-CM

## 2024-08-29 PROCEDURE — 76642 ULTRASOUND BREAST LIMITED: CPT | Mod: RT

## 2024-08-29 PROCEDURE — 76982 USE 1ST TARGET LESION: CPT | Mod: RT

## 2024-08-30 ENCOUNTER — TELEPHONE (OUTPATIENT)
Dept: SURGICAL ONCOLOGY | Facility: HOSPITAL | Age: 29
End: 2024-08-30
Payer: COMMERCIAL

## 2024-08-30 NOTE — TELEPHONE ENCOUNTER
Result Communication    Pt has apt 9/25/24 for evaluation.    Resulted Orders   BI US breast limited right    Narrative    Interpreted By:  Abiel Scruggs,   STUDY:  BI US BREAST LIMITED RIGHT;  8/29/2024 3:50 pm      ACCESSION NUMBER(S):  [id number]      ORDERING CLINICIAN:  BOZENA SIMS      INDICATION:  Right breast lump      ,N63.10 Unspecified lump in the right breast, unspecified quadrant      COMPARISON:  None.      FINDINGS:  Targeted ultrasound was performed of the right breast by a registered  sonographer with elastography.      Graphic images were obtained of the right breast at the 9 o'clock  position 5 cm from the nipple in the area of palpable concern. Here  there is a mass seen. It is predominantly solid with scattered cystic  spaces within it. It is oval and circumscribed. It measures 4.5 x 4.7  x 3.1 cm. It demonstrates internal vascularity. Elastography  demonstrates predominantly soft features.      Images of the axilla demonstrate no adenopathy.        Impression    Palpable abnormality correlates to an indeterminate solid mass. 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 Regardless,  ultrasound-guided core biopsy is recommended for further evaluation.      The findings and recommendations were discussed with the patient the  time of interpretation. She will be scheduled for biopsy.      BI-RADS CATEGORY:  BI-RADS Category:  4 Suspicious.  Recommendation:  Surgical Consultation and Biopsy.  Recommended Date:  Immediate.  Laterality:  Right.      For any future breast imaging appointments, please call [phone]).      Method of Detection: Category Pat - Patient Reported Self-examination  Finding      MACRO:  None      Signed by: Abiel Scruggs 8/29/2024 3:58 PM  Dictation workstation:   [id number]       8:23 AM

## 2024-08-30 NOTE — TELEPHONE ENCOUNTER
----- Message from Lizbeth Tan sent at 8/29/2024  4:23 PM EDT -----    ----- Message -----  From: Interface, Radiology Results In  Sent: 8/29/2024   4:00 PM EDT  To: JOSE Minaya

## 2024-09-23 PROBLEM — N63.10 MASS OF RIGHT BREAST: Status: ACTIVE | Noted: 2023-11-06

## 2024-09-23 PROBLEM — R92.8 ABNORMAL FINDING ON BREAST IMAGING: Status: ACTIVE | Noted: 2024-09-23

## 2024-09-23 NOTE — PROGRESS NOTES
[facility]  Les Workman female   1995 29 y.o.  [id number]      Chief Complaint  New patient, biopsy consultation.    History Of Present Illness  Les Workman is a very pleasant 29 y.o. AA female seen in the breast center for biopsy consultation. She denies breast surgery or biopsy. She has family history of breast cancer in her maternal grandmother.    BREAST IMAGIN2024 Right ultrasound, indicates BI-RADS Category 4. Palpable abnormality correlates to an indeterminate solid mass. Its sonographic features are suggestive of a phyllodes tumor. Regardless, ultrasound-guided core biopsy is recommended for further evaluation.    REPRODUCTIVE HISTORY: menarche age 14, , first birth age 21,  x 6 months, OCP's < 5 years, premenopausal, LMP monthly                              FAMILY CANCER HISTORY:   Maternal Grandmother: Breast cancer, unknown age  Maternal Grandfather: Colon cancer, unknown age    Review of Systems  Constitutional:  Negative for appetite change, fatigue, fever and unexpected weight change.   HENT:  Negative for ear pain, hearing loss, nosebleeds, sore throat and trouble swallowing.    Eyes:  Negative for discharge, itching and visual disturbance.   Breast: As stated in HPI.  Respiratory:  Negative for cough, chest tightness and shortness of breath.    Cardiovascular:  Negative for chest pain, palpitations and leg swelling.   Gastrointestinal:  Negative for abdominal pain, constipation, diarrhea and nausea.   Endocrine: Negative for cold intolerance and heat intolerance.   Genitourinary:  Negative for dysuria, frequency, hematuria, pelvic pain and vaginal bleeding.   Musculoskeletal:  Negative for arthralgias, back pain, gait problem, joint swelling and myalgias.   Skin:  Negative for color change and rash.   Allergic/Immunologic: Negative for environmental allergies and food allergies.   Neurological:  Negative for dizziness, tremors, speech  difficulty, weakness, numbness and headaches.   Hematological:  Does not bruise/bleed easily.   Psychiatric/Behavioral:  Negative for agitation, dysphoric mood and sleep disturbance. The patient is not nervous/anxious.       Past Medical History  She has a past medical history of Encounter for elective termination of pregnancy (08/16/2019), Personal history of other complications of pregnancy, childbirth and the puerperium (08/16/2019), and Personal history of other infectious and parasitic diseases (06/14/2021).    Surgical History  She has a past surgical history that includes Other surgical history (05/06/2022) and Thyroidectomy.    Family History  Cancer-related family history includes Breast cancer in her maternal grandmother; Colon cancer in her maternal grandfather.     Social History  She reports that she has been smoking cigarettes. She has never used smokeless tobacco. She reports current alcohol use. She reports that she does not use drugs.    Allergies  Banana, Cat hair standardized allergenic extract, Shrimp, and Pollen extracts    Medications  Current Outpatient Medications   Medication Instructions    cholecalciferol (VITAMIN D-3) 2,000 Units, oral, Daily    citalopram (CeleXA) 20 mg tablet 1 tablet, oral, Daily    cyclobenzaprine (Flexeril) 5 mg tablet 1 tablet, oral, 3 times daily    EluRyng 0.12-0.015 mg/24 hr vaginal ring vaginal    ergocalciferol (VITAMIN D-2) 50,000 Units, oral, Once Weekly,  for 8 weeks then reduce dose to 2000 units daily<BR>    hydrOXYzine pamoate (Vistaril) 25 mg capsule 1 capsule, oral, 3 times daily PRN    levothyroxine (Synthroid, Levoxyl) 112 mcg tablet TAKE 1 TABLET BY MOUTH ONCE DAILY ON AN EMPTY STOMACH AT LEAST 1 HOUR BEFORE BREAKFAST    loratadine (Claritin) 10 mg tablet 1 tablet, oral, Daily PRN    LORazepam (Ativan) 0.5 mg tablet 1 tablet, oral, Daily PRN    traMADol (ULTRAM)  mg, oral, Every 4 hours PRN       Last Recorded Vitals  Vitals:    09/25/24 0849    BP: 119/83   Pulse: 75   Temp: 36.7 °C (98 °F)         Physical Exam  Chest:         Patient is alert and oriented x3 and in a relaxed and appropriate mood. Her gait is steady and hand grasps are equal. Sclera is clear. The breasts are nearly symmetrical. Right breast 9:00 there is a 5 x 4 cm mass, round, and mobile. The right breast tissue is soft without palpable abnormalities, discrete nodules or masses. The skin and nipples appear normal, bilateral nipples are pierced. There is no cervical, supraclavicular or axillary lymphadenopathy. Heart rate and rhythm normal, S1 and S2 appreciated. The lungs are clear to auscultation bilaterally.       Relevant Results and Imaging    Study Result    Narrative & Impression   Interpreted By:  Abiel Scruggs,   STUDY:  BI US BREAST LIMITED RIGHT;  8/29/2024 3:50 pm      ACCESSION NUMBER(S):  [id number]      ORDERING CLINICIAN:  BOZENA SIMS      INDICATION:  Right breast lump      ,N63.10 Unspecified lump in the right breast, unspecified quadrant      COMPARISON:  None.      FINDINGS:  Targeted ultrasound was performed of the right breast by a registered  sonographer with elastography.      Graphic images were obtained of the right breast at the 9 o'clock  position 5 cm from the nipple in the area of palpable concern. Here  there is a mass seen. It is predominantly solid with scattered cystic  spaces within it. It is oval and circumscribed. It measures 4.5 x 4.7  x 3.1 cm. It demonstrates internal vascularity. Elastography  demonstrates predominantly soft features.      Images of the axilla demonstrate no adenopathy.      IMPRESSION:  Palpable abnormality correlates to an indeterminate solid mass. 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 Regardless,  ultrasound-guided core biopsy is recommended for further evaluation.      The findings and recommendations were discussed with the patient the  time of interpretation. She will be scheduled for biopsy.       BI-RADS CATEGORY:  BI-RADS Category:  4 Suspicious.  Recommendation:  Surgical Consultation and Biopsy.  Recommended Date:  Immediate.  Laterality:  Right.      For any future breast imaging appointments, please call [phone]  ([phone]).      Method of Detection: Category Pat - Patient Reported Self-examination  Finding      MACRO:  None      Signed by: Abiel Scruggs 8/29/2024 3:58 PM     I explained the results in depth, along with suggested explanation for follow up recommendations based on the testing results. BI-RADS Category 4      Visit Diagnosis  1. Abnormal finding on breast imaging        2. Mass of right breast, unspecified quadrant            Assessment/Plan  Right breast mass, no breast surgery or biopsy, family history of breast cancer      Plan:  Right breast ultrasound guided core biopsy.    Patient Discussion/Summary  Proceed to biopsy. A breast radiology physician will perform the biopsy. Results are usually available in about 7 business days. I will call patient with results and instruct on next steps and plan.     IMPORTANT INFORMATION REGARDING YOUR RESULTS    If you receive medical information from My [facility] Personal Health Record (online chart) your results will be released into your chart. This means you may view or see results of your biopsy or procedure before I contact you directly. If this occurs, please call the office and we will discuss your results over the phone.    You can see your health information, review clinical summaries from office visits & test results online when you follow your health with MY  Chart, a personal health record. To sign up go to [email or web address]. If you need assistance with signing up or trouble getting into your account call [employer] Patient Line 24/7 at [phone].    My office phone number is [phone]  if you need to get in touch with me or have additional questions or concerns. Thank you for choosing [facility] and trusting  me as your healthcare provider. I look forward to seeing you again at your next office visit. I am honored to be a provider on your health care team and I remain dedicated to helping you achieve your health goals.       Brandie Barragan, NAYELY-CNP

## 2024-09-25 ENCOUNTER — HOSPITAL ENCOUNTER (OUTPATIENT)
Dept: RADIOLOGY | Facility: CLINIC | Age: 29
Discharge: HOME | End: 2024-09-25
Payer: COMMERCIAL

## 2024-09-25 ENCOUNTER — PROCEDURE VISIT (OUTPATIENT)
Dept: SURGICAL ONCOLOGY | Facility: CLINIC | Age: 29
End: 2024-09-25
Payer: COMMERCIAL

## 2024-09-25 VITALS
TEMPERATURE: 98 F | DIASTOLIC BLOOD PRESSURE: 83 MMHG | SYSTOLIC BLOOD PRESSURE: 119 MMHG | WEIGHT: 156 LBS | HEART RATE: 75 BPM | BODY MASS INDEX: 27.75 KG/M2

## 2024-09-25 DIAGNOSIS — R92.8 ABNORMAL FINDING ON BREAST IMAGING: Primary | ICD-10-CM

## 2024-09-25 DIAGNOSIS — R92.8 OTHER ABNORMAL AND INCONCLUSIVE FINDINGS ON DIAGNOSTIC IMAGING OF BREAST: ICD-10-CM

## 2024-09-25 DIAGNOSIS — N63.10 MASS OF RIGHT BREAST, UNSPECIFIED QUADRANT: ICD-10-CM

## 2024-09-25 PROCEDURE — 19083 BX BREAST 1ST LESION US IMAG: CPT | Mod: RT

## 2024-09-25 PROCEDURE — 19083 BX BREAST 1ST LESION US IMAG: CPT | Mod: RIGHT SIDE | Performed by: STUDENT IN AN ORGANIZED HEALTH CARE EDUCATION/TRAINING PROGRAM

## 2024-09-25 PROCEDURE — A4648 IMPLANTABLE TISSUE MARKER: HCPCS

## 2024-09-25 PROCEDURE — 99214 OFFICE O/P EST MOD 30 MIN: CPT | Performed by: NURSE PRACTITIONER

## 2024-09-25 PROCEDURE — 99204 OFFICE O/P NEW MOD 45 MIN: CPT | Performed by: NURSE PRACTITIONER

## 2024-09-25 PROCEDURE — 2720000007 HC OR 272 NO HCPCS

## 2024-09-25 PROCEDURE — 2500000005 HC RX 250 GENERAL PHARMACY W/O HCPCS: Performed by: STUDENT IN AN ORGANIZED HEALTH CARE EDUCATION/TRAINING PROGRAM

## 2024-09-25 ASSESSMENT — PAIN SCALES - GENERAL
PAINLEVEL_OUTOF10: 0 - NO PAIN
PAINLEVEL: 0-NO PAIN
PAINLEVEL_OUTOF10: 0 - NO PAIN

## 2024-09-25 NOTE — Clinical Note
Patient offered aromatherapy, warm blankets and music. Guided imagery, touch and relaxation breathing to be used throughout the procedure.    Procedural steps explained and patient given opportunity to verbalize concerns and seek clarification.  Post procedure self-care and potential for bruising , hematoma, and pain reviewed.  Patient verbalizes understanding.

## 2024-09-25 NOTE — Clinical Note
Done. Pressure held x 10 minutes, incision dry, steri strips intact and compression dressing applied.

## 2024-09-25 NOTE — Clinical Note
Mom at bedside for reviewing of discharge instructions. Patient and mom both verbalized an understanding.

## 2024-10-01 LAB
LABORATORY COMMENT REPORT: NORMAL
PATH REPORT.FINAL DX SPEC: NORMAL
PATH REPORT.GROSS SPEC: NORMAL
PATH REPORT.RELEVANT HX SPEC: NORMAL
PATH REPORT.TOTAL CANCER: NORMAL

## 2024-10-02 ENCOUNTER — TELEPHONE (OUTPATIENT)
Dept: SURGICAL ONCOLOGY | Facility: CLINIC | Age: 29
End: 2024-10-02
Payer: COMMERCIAL

## 2024-10-02 NOTE — TELEPHONE ENCOUNTER
Result Communication    Spoke with Les Workman regarding breast biopsy results and surgical excision recommended. Office will call to schedule.      Resulted Orders   BI US guided breast localization and biopsy right    Addendum: 10/1/2024    Interpreted By:  Shoaib Waldron,   ADDENDUM:  The final pathology for ultrasound-guided biopsy of a 4.7 x 4.5 x 3.1  cm palpable mass in the right breast at 9 o'clock, 5 cm from the  nipple (open coil clip) demonstrates fibroepithelial lesion with  variably cellular stroma, columnar cell hyperplasia, apocrine  metaplasia and pseudoangiomatous stromal hyperplasia. This is  concordant with the imaging findings.  Surgical consultation is  recommended for excisional biopsy.      Signed by: Shoaib Waldron 10/1/2024 2:13 PM      -------- ORIGINAL REPORT --------  Dictation workstation:   [id number]      Narrative    Interpreted By:  Shoaib Waldron and Marshall Colin   STUDY:  BI US GUIDED BREAST LOCALIZATION AND BIOPSY RIGHT;  9/25/2024 9:57 am      ACCESSION NUMBER(S):  [id number]      ORDERING CLINICIAN:  HECTOR SANDOVAL      INDICATION:  29-year-old woman with a suspicious palpable right breast mass.      ,R92.8 Other abnormal and inconclusive findings on diagnostic imaging  of breast      FINDINGS:  PREPROCEDURAL CONSULTATION: The history and physical exam pertinent  to the procedure were reviewed and no updates were made.      The procedure was explained to the patient including the risks,  benefits, and alternatives. Medications were discussed, including any  prior use of blood thinning medications by the patient. Patient  allergies were reviewed. The risks, including but not limited to  infection and bleeding, were reviewed by the performing physician and  the patient agreed to undergo the procedure.      Prior to the procedure, an audible timeout was done to verify patient  identification, site and type of procedure. Dr. Shoaib Waldron, Dr. Daniel Ingram, santosh  radiology nurse and an ultrasound technologist were  present.      PROCEDURE: Scanning of the right breast redemonstrated the mass of  concern in the 9 o'clock position, 5 cm from the nipple. The right  breast was marked under ultrasound guidance and cleansed.  11 mL of  buffered 1% lidocaine was injected subcutaneously and then into the  deeper tissues surrounding the mass. A small incision was made.  12  tissue core specimens were then obtained with a 12-gauge  vacuum-assisted biopsy needle with minimal bleeding (estimated blood  loss less than 10 mL).  An open coil Hydromark tissue marker was then  placed into the biopsy site.  Hemostasis was achieved with manual  compression. The patient tolerated the procedure without difficulty.      Placement of the biopsy marker within the mass was confirmed on  sonographic images. Postprocedure mammogram was deferred due to  patient's age.      The patient experienced no complications during the procedure.  Home-going/follow-up instructions were reviewed with the patient  before she left the department.        Impression    Status post ultrasound guided core needle biopsy of a right breast  mass followed by tissue marker placement. Pathology is pending.      MACRO:  None          Signed by: Shoaib Waldron 9/25/2024 10:19 AM  Dictation workstation:   [id number]       12:26 PM

## 2024-10-04 ENCOUNTER — TELEPHONE (OUTPATIENT)
Dept: PRIMARY CARE | Facility: CLINIC | Age: 29
End: 2024-10-04
Payer: COMMERCIAL

## 2024-10-17 NOTE — PROGRESS NOTES
Chief Complaint  Right breast mass    History of Present Illness    Referring Provider: Yung  PCP A Alonso    28 yo AA female here with right breast mass  Right breast biopsy with fibroepithelial lesion  Here w mother    History:  1) No abnormal mammograms, breast biopsies, or breast surgeries.  2) right breast mass x 1 year. Unchanged per pt.  3) 2024.  Right breast ultrasound.  At 9:00 5 cm from the nipple a 4.5 x 4.7 x 3.1 cm predominantly solid mass is noted, BI-RADS 4.  Right axillary ultrasound is negative. Imaging findings suggestive of phyllodes  4) 2024.  Right breast 9:00 5 cm from the nipple biopsy.  Fibroepithelial lesion with variably cellular stroma, columnar cell hyperplasia and PASH.  Advised excision for definitive classification      She presents today for further management and surgical discussion.    Ob/gyn history:  Menarche 14  Menopause premenopausal  , age of first delivery 21  < 5 years OCPs, no fertility treatments, no HRT    Mat grandmother with breast cancer  Mat grandfather with colon cancer    Review of systems  A comprehensive ROS was taken on the patient intake form and reviewed with the patient. This form is scanned into the electronic medical record.    Constitutional symptoms: Denies generalized fatigue. Denies weight change, fevers/chills, difficulty sleeping   Eyes: Denies double vision, glaucoma, cataracts.  Ear/nose/throat/mouth: Denies hearing changes, sore throat, sinus problems.  Cardiovascular: No chest pain. Denies irregular heartbeat. Denies ankle swelling.  Respiratory: No wheezing, cough, or shortness of breath.  Gastrointestinal: No abdominal pain, No nausea/vomiting. No indigestion/heartburn. No change in bowel habits. No constipation or diarrhea.   Genitourinary: No urinary incontinence. No urinary frequency. No painful urination.  Musculoskeletal: No bone pain, no muscle pain, no joint pain.   Integumentary: No rash. No masses. No changes in  moles. No easy bruising.  Neurological: No headaches. No tremors. No numbness/tingling.  Psychiatric: No anxiety. No depression.  Endocrine: No excessive thirst. Not too hot or too cold. Not tired or fatigued.    Hematological/lymphatic: No swollen glands or blood clotting problems. No bruising.     Physical exam  A chaperone was offered for all portions of the physical exam. The patient declined.     General appearance: appears stated age, alert and oriented x 3  Head: Normocephalic, atraumatic  Eyes: conjunctivae/corneas clear.  Ears: External ears are normal, hearing is grossly intact.  Lungs: normal breathing  Heart: regular   Abdomen: Soft, nontender, nondistended.  Neurologic: grossly normal  Lymph nodes: No cervical, supraclavicular or axillary lymphadenopathy bilaterally    Breast: A comprehensive breast exam was performed in the seated and supine positions. Breasts are symmetrical. Bilateral nipples are everted. Bilateral nipple piercings. There are no skin changes on arm maneuvers. Bra size: 38C   Right: at 9:00 just lateral to the areola there is an easily palpable 5 cm mass   Left: no masses    Results  Imaging  All breast imaging personally reviewed by me.  See HPI    Pathology   9/25/2024.  Right breast 9:00 5 cm from the nipple biopsy.  Fibroepithelial lesion with variably cellular stroma, columnar cell hyperplasia and PASH.  Advised excision for definitive classification    Impression:   1) 30 yo AA female here with right breast mass  Right breast biopsy with fibroepithelial lesion  2) Co-morbidities include hypothyroidism. Non-smoker  3) FH of breast cancer      Plan:   She is here with her mother.  She noted a right breast mass about 1 year ago.  She notes it to be stable.  An indeterminate 5 cm mass was confirmed on ultrasound.  Biopsy reveals a fibroepithelial lesion.    We discussed that a fibroadenoma is a benign lesion that does not become cancer or increase ones risk of breast cancer.   Fibroadenomas are sometimes multiple and bilateral.   I discussed the spectrum fibroepithelial lesions including fibroadenomas and phyllodes tumors (benign, borderline, malignant).  We discussed the inherent undersampling of core biopsy.  I explained my indications for fibroadenoma excision: 1) large FA (> 3 cm) 2) growing FA 3) pt preference. We discussed that excision of large and growing fibroadenomas are partly to rule out a phyllodes tumor.  We discussed the surgical treatment of phyllodes tumor: wide local excision to grossly negative 1 cm margins.  We discussed the risks of surgery, which include but are not limited to risks of anesthesia, bleeding, infection, injury to nearby by structures, need for further surgery, poor wound healing, change in shape, contour and sensation of the breast and nipple.  She understands a possible impact on future breast feeding. She understands the possibility of additional surgery if a phyllodes is found on final pathology.    I advised excision of this mass for definitive diagnosis.  She wishes to proceed.  She will be scheduled for surgery.  She will follow-up about 2 weeks after surgery to review her pathology report.  She should call with any sooner concerns.

## 2024-10-21 ENCOUNTER — OFFICE VISIT (OUTPATIENT)
Dept: SURGICAL ONCOLOGY | Facility: CLINIC | Age: 29
End: 2024-10-21
Payer: COMMERCIAL

## 2024-10-21 VITALS
SYSTOLIC BLOOD PRESSURE: 111 MMHG | BODY MASS INDEX: 27.75 KG/M2 | WEIGHT: 156 LBS | TEMPERATURE: 98.1 F | HEART RATE: 97 BPM | DIASTOLIC BLOOD PRESSURE: 70 MMHG

## 2024-10-21 DIAGNOSIS — N63.11 MASS OF UPPER OUTER QUADRANT OF RIGHT BREAST: Primary | ICD-10-CM

## 2024-10-21 DIAGNOSIS — R92.8 ABNORMAL FINDING ON BREAST IMAGING: ICD-10-CM

## 2024-10-21 PROCEDURE — 99214 OFFICE O/P EST MOD 30 MIN: CPT | Performed by: SURGERY

## 2024-10-21 RX ORDER — CEFAZOLIN SODIUM 2 G/100ML
2 INJECTION, SOLUTION INTRAVENOUS ONCE
OUTPATIENT
Start: 2024-10-21 | End: 2024-10-21

## 2024-10-21 ASSESSMENT — PATIENT HEALTH QUESTIONNAIRE - PHQ9
SUM OF ALL RESPONSES TO PHQ9 QUESTIONS 1 & 2: 0
1. LITTLE INTEREST OR PLEASURE IN DOING THINGS: NOT AT ALL
2. FEELING DOWN, DEPRESSED OR HOPELESS: NOT AT ALL

## 2024-10-21 ASSESSMENT — PAIN SCALES - GENERAL: PAINLEVEL_OUTOF10: 0-NO PAIN

## 2024-11-08 ENCOUNTER — TELEPHONE (OUTPATIENT)
Dept: SURGICAL ONCOLOGY | Facility: CLINIC | Age: 29
End: 2024-11-08
Payer: COMMERCIAL

## 2024-11-08 NOTE — TELEPHONE ENCOUNTER
Left message on mother [employer] phone. Multiple calls to Les from pre admission testing department. Trying to reach patient.

## 2024-11-19 ENCOUNTER — DOCUMENTATION (OUTPATIENT)
Dept: SURGERY | Facility: HOSPITAL | Age: 29
End: 2024-11-19
Payer: COMMERCIAL

## 2024-11-19 ENCOUNTER — TELEPHONE (OUTPATIENT)
Dept: SURGICAL ONCOLOGY | Facility: CLINIC | Age: 29
End: 2024-11-19
Payer: COMMERCIAL

## 2024-11-19 DIAGNOSIS — N63.11 MASS OF UPPER OUTER QUADRANT OF RIGHT BREAST: Primary | ICD-10-CM

## 2024-11-19 NOTE — TELEPHONE ENCOUNTER
Patient called to cancel her surgery for 11/20. Unable to reschedule at this time and will call the office when ready to reschedule.

## 2024-11-19 NOTE — PROGRESS NOTES
Received a secure message on Monday 11/18 from Brittany Panchal    Pt called the office to cancel her surgery on Wednesday 11/20.   Pt to call back to reschedule.   Reports that she is having oral surgery as the reason for her cancellation.

## 2024-12-09 ENCOUNTER — APPOINTMENT (OUTPATIENT)
Dept: SURGICAL ONCOLOGY | Facility: CLINIC | Age: 29
End: 2024-12-09
Payer: COMMERCIAL

## 2025-01-30 DIAGNOSIS — R79.89 LOW TSH LEVEL: ICD-10-CM

## 2025-02-03 RX ORDER — LEVOTHYROXINE SODIUM 112 UG/1
TABLET ORAL
Qty: 30 TABLET | Refills: 1 | Status: SHIPPED | OUTPATIENT
Start: 2025-02-03 | End: 2026-02-03

## 2025-03-26 ENCOUNTER — APPOINTMENT (OUTPATIENT)
Dept: PRIMARY CARE | Facility: CLINIC | Age: 30
End: 2025-03-26
Payer: COMMERCIAL

## 2025-05-21 ENCOUNTER — HOSPITAL ENCOUNTER (EMERGENCY)
Facility: HOSPITAL | Age: 30
Discharge: HOME | End: 2025-05-21
Attending: STUDENT IN AN ORGANIZED HEALTH CARE EDUCATION/TRAINING PROGRAM
Payer: COMMERCIAL

## 2025-05-21 VITALS
SYSTOLIC BLOOD PRESSURE: 123 MMHG | HEART RATE: 96 BPM | DIASTOLIC BLOOD PRESSURE: 89 MMHG | RESPIRATION RATE: 18 BRPM | TEMPERATURE: 98.6 F | BODY MASS INDEX: 24.8 KG/M2 | HEIGHT: 63 IN | WEIGHT: 140 LBS | OXYGEN SATURATION: 98 %

## 2025-05-21 DIAGNOSIS — E03.9 HYPOTHYROIDISM, UNSPECIFIED TYPE: ICD-10-CM

## 2025-05-21 DIAGNOSIS — Z91.148 NONCOMPLIANCE WITH MEDICATIONS: Primary | ICD-10-CM

## 2025-05-21 DIAGNOSIS — F41.9 ANXIETY: ICD-10-CM

## 2025-05-21 LAB
ALBUMIN SERPL BCP-MCNC: 4.6 G/DL (ref 3.4–5)
ALP SERPL-CCNC: 43 U/L (ref 33–110)
ALT SERPL W P-5'-P-CCNC: 16 U/L (ref 7–45)
AMPHETAMINES UR QL SCN: NORMAL
ANION GAP SERPL CALC-SCNC: 14 MMOL/L (ref 10–20)
APPEARANCE UR: CLEAR
AST SERPL W P-5'-P-CCNC: 21 U/L (ref 9–39)
BARBITURATES UR QL SCN: NORMAL
BASOPHILS # BLD AUTO: 0.02 X10*3/UL (ref 0–0.1)
BASOPHILS NFR BLD AUTO: 0.4 %
BENZODIAZ UR QL SCN: NORMAL
BILIRUB SERPL-MCNC: 0.3 MG/DL (ref 0–1.2)
BILIRUB UR STRIP.AUTO-MCNC: NEGATIVE MG/DL
BUN SERPL-MCNC: 9 MG/DL (ref 6–23)
BZE UR QL SCN: NORMAL
CALCIUM SERPL-MCNC: 9.7 MG/DL (ref 8.6–10.3)
CANNABINOIDS UR QL SCN: NORMAL
CHLORIDE SERPL-SCNC: 106 MMOL/L (ref 98–107)
CO2 SERPL-SCNC: 22 MMOL/L (ref 21–32)
COLOR UR: COLORLESS
CREAT SERPL-MCNC: 0.7 MG/DL (ref 0.5–1.05)
EGFRCR SERPLBLD CKD-EPI 2021: >90 ML/MIN/1.73M*2
EOSINOPHIL # BLD AUTO: 0.21 X10*3/UL (ref 0–0.7)
EOSINOPHIL NFR BLD AUTO: 4.4 %
ERYTHROCYTE [DISTWIDTH] IN BLOOD BY AUTOMATED COUNT: 14.3 % (ref 11.5–14.5)
FENTANYL+NORFENTANYL UR QL SCN: NORMAL
GLUCOSE SERPL-MCNC: 87 MG/DL (ref 74–99)
GLUCOSE UR STRIP.AUTO-MCNC: NORMAL MG/DL
HCG UR QL IA.RAPID: NEGATIVE
HCT VFR BLD AUTO: 40.4 % (ref 36–46)
HGB BLD-MCNC: 13.5 G/DL (ref 12–16)
IMM GRANULOCYTES # BLD AUTO: 0.01 X10*3/UL (ref 0–0.7)
IMM GRANULOCYTES NFR BLD AUTO: 0.2 % (ref 0–0.9)
KETONES UR STRIP.AUTO-MCNC: NEGATIVE MG/DL
LEUKOCYTE ESTERASE UR QL STRIP.AUTO: NEGATIVE
LYMPHOCYTES # BLD AUTO: 1 X10*3/UL (ref 1.2–4.8)
LYMPHOCYTES NFR BLD AUTO: 21 %
MCH RBC QN AUTO: 28.9 PG (ref 26–34)
MCHC RBC AUTO-ENTMCNC: 33.4 G/DL (ref 32–36)
MCV RBC AUTO: 87 FL (ref 80–100)
METHADONE UR QL SCN: NORMAL
MONOCYTES # BLD AUTO: 0.6 X10*3/UL (ref 0.1–1)
MONOCYTES NFR BLD AUTO: 12.6 %
NEUTROPHILS # BLD AUTO: 2.92 X10*3/UL (ref 1.2–7.7)
NEUTROPHILS NFR BLD AUTO: 61.4 %
NITRITE UR QL STRIP.AUTO: NEGATIVE
NRBC BLD-RTO: 0 /100 WBCS (ref 0–0)
OPIATES UR QL SCN: NORMAL
OXYCODONE+OXYMORPHONE UR QL SCN: NORMAL
PCP UR QL SCN: NORMAL
PH UR STRIP.AUTO: 6.5 [PH]
PLATELET # BLD AUTO: 265 X10*3/UL (ref 150–450)
POTASSIUM SERPL-SCNC: 3.6 MMOL/L (ref 3.5–5.3)
PROT SERPL-MCNC: 7.7 G/DL (ref 6.4–8.2)
PROT UR STRIP.AUTO-MCNC: NEGATIVE MG/DL
RBC # BLD AUTO: 4.67 X10*6/UL (ref 4–5.2)
RBC # UR STRIP.AUTO: NEGATIVE MG/DL
S PYO DNA THROAT QL NAA+PROBE: NOT DETECTED
SODIUM SERPL-SCNC: 138 MMOL/L (ref 136–145)
SP GR UR STRIP.AUTO: 1
T4 FREE SERPL-MCNC: 0.7 NG/DL (ref 0.61–1.12)
TSH SERPL-ACNC: 31.95 MIU/L (ref 0.44–3.98)
UROBILINOGEN UR STRIP.AUTO-MCNC: NORMAL MG/DL
WBC # BLD AUTO: 4.8 X10*3/UL (ref 4.4–11.3)

## 2025-05-21 PROCEDURE — 81003 URINALYSIS AUTO W/O SCOPE: CPT | Performed by: NURSE PRACTITIONER

## 2025-05-21 PROCEDURE — 87651 STREP A DNA AMP PROBE: CPT | Performed by: NURSE PRACTITIONER

## 2025-05-21 PROCEDURE — 36415 COLL VENOUS BLD VENIPUNCTURE: CPT | Performed by: NURSE PRACTITIONER

## 2025-05-21 PROCEDURE — 2500000002 HC RX 250 W HCPCS SELF ADMINISTERED DRUGS (ALT 637 FOR MEDICARE OP, ALT 636 FOR OP/ED): Performed by: NURSE PRACTITIONER

## 2025-05-21 PROCEDURE — 81025 URINE PREGNANCY TEST: CPT | Performed by: NURSE PRACTITIONER

## 2025-05-21 PROCEDURE — 99284 EMERGENCY DEPT VISIT MOD MDM: CPT | Performed by: STUDENT IN AN ORGANIZED HEALTH CARE EDUCATION/TRAINING PROGRAM

## 2025-05-21 PROCEDURE — 96374 THER/PROPH/DIAG INJ IV PUSH: CPT

## 2025-05-21 PROCEDURE — 84443 ASSAY THYROID STIM HORMONE: CPT | Performed by: NURSE PRACTITIONER

## 2025-05-21 PROCEDURE — 85025 COMPLETE CBC W/AUTO DIFF WBC: CPT | Performed by: NURSE PRACTITIONER

## 2025-05-21 PROCEDURE — 80053 COMPREHEN METABOLIC PANEL: CPT | Performed by: NURSE PRACTITIONER

## 2025-05-21 PROCEDURE — 84439 ASSAY OF FREE THYROXINE: CPT | Performed by: NURSE PRACTITIONER

## 2025-05-21 PROCEDURE — 2500000001 HC RX 250 WO HCPCS SELF ADMINISTERED DRUGS (ALT 637 FOR MEDICARE OP): Performed by: NURSE PRACTITIONER

## 2025-05-21 PROCEDURE — 80307 DRUG TEST PRSMV CHEM ANLYZR: CPT | Performed by: NURSE PRACTITIONER

## 2025-05-21 PROCEDURE — 2500000004 HC RX 250 GENERAL PHARMACY W/ HCPCS (ALT 636 FOR OP/ED): Performed by: NURSE PRACTITIONER

## 2025-05-21 RX ORDER — HYDROXYZINE PAMOATE 25 MG/1
25 CAPSULE ORAL 3 TIMES DAILY PRN
Qty: 30 CAPSULE | Refills: 0 | Status: SHIPPED | OUTPATIENT
Start: 2025-05-21 | End: 2025-05-31

## 2025-05-21 RX ORDER — LEVOTHYROXINE SODIUM 100 UG/1
112 TABLET ORAL DAILY
Qty: 30 TABLET | Refills: 0 | Status: SHIPPED | OUTPATIENT
Start: 2025-05-21 | End: 2025-06-20

## 2025-05-21 RX ORDER — HYDROXYZINE HYDROCHLORIDE 25 MG/1
25 TABLET, FILM COATED ORAL ONCE
Status: DISCONTINUED | OUTPATIENT
Start: 2025-05-21 | End: 2025-05-21 | Stop reason: HOSPADM

## 2025-05-21 RX ORDER — LORAZEPAM 2 MG/ML
1 INJECTION INTRAMUSCULAR ONCE
Status: COMPLETED | OUTPATIENT
Start: 2025-05-21 | End: 2025-05-21

## 2025-05-21 RX ORDER — LEVOTHYROXINE SODIUM 112 UG/1
112 TABLET ORAL ONCE
Status: COMPLETED | OUTPATIENT
Start: 2025-05-21 | End: 2025-05-21

## 2025-05-21 RX ADMIN — LORAZEPAM 1 MG: 2 INJECTION INTRAMUSCULAR; INTRAVENOUS at 09:33

## 2025-05-21 RX ADMIN — LEVOTHYROXINE SODIUM 112 MCG: 0.11 TABLET ORAL at 12:02

## 2025-05-21 SDOH — SOCIAL STABILITY: SOCIAL INSECURITY: FAMILY BEHAVIORS: APPROPRIATE FOR SITUATION

## 2025-05-21 SDOH — HEALTH STABILITY: MENTAL HEALTH: BEHAVIORS/MOOD: ANXIOUS;PACING

## 2025-05-21 SDOH — SOCIAL STABILITY: SOCIAL NETWORK: PARENT/GUARDIAN/SIGNIFICANT OTHER INVOLVEMENT: ATTENTIVE TO PATIENT NEEDS

## 2025-05-21 SDOH — HEALTH STABILITY: MENTAL HEALTH: BEHAVIORAL HEALTH(WDL): EXCEPTIONS TO WDL

## 2025-05-21 SDOH — SOCIAL STABILITY: SOCIAL NETWORK: VISITOR BEHAVIORS: APPROPRIATE FOR SITUATION

## 2025-05-21 ASSESSMENT — COLUMBIA-SUICIDE SEVERITY RATING SCALE - C-SSRS
1. IN THE PAST MONTH, HAVE YOU WISHED YOU WERE DEAD OR WISHED YOU COULD GO TO SLEEP AND NOT WAKE UP?: NO
2. HAVE YOU ACTUALLY HAD ANY THOUGHTS OF KILLING YOURSELF?: NO
6. HAVE YOU EVER DONE ANYTHING, STARTED TO DO ANYTHING, OR PREPARED TO DO ANYTHING TO END YOUR LIFE?: NO

## 2025-05-21 ASSESSMENT — PAIN - FUNCTIONAL ASSESSMENT: PAIN_FUNCTIONAL_ASSESSMENT: 0-10

## 2025-05-21 ASSESSMENT — PAIN SCALES - GENERAL: PAINLEVEL_OUTOF10: 0 - NO PAIN

## 2025-05-21 NOTE — ED TRIAGE NOTES
Patient with a history of thyroid issues presents in a state of panic, patient visually anxious during triage stating she has not slept last night.

## 2025-05-21 NOTE — ED PROVIDER NOTES
HPI   Chief Complaint   Patient presents with    Anxiety       29-year-old female who had a history of a thyroidectomy a year ago and is not consistent with her Synthroid presents today with high anxiety this started 2 days ago.  She had missed her medication for 2 days.  She endorses chills but denies fever.  She denies pharyngitis.  She denies chest pain or dyspnea.  Vital signs were stable in triage.  She is not not sexually active for the last 2 months and denies pregnancy.  Her menstrual period is due in 3 days.  She is not on birth control.  She denies diarrhea or constipation.  She denies abdominal pain.  She denies melena, hematochezia, hematuria or dysuria.  She has no other cause for concern or complaint.  She tried melatonin with no relief.  She tried 25 mg Benadryl with no relief.  She denies any thoughts of harm to herself or others.      History provided by:  Patient   used: No            Patient History   Medical History[1]  Surgical History[2]  Family History[3]  Social History[4]    Physical Exam   ED Triage Vitals [05/21/25 0853]   Temperature Heart Rate Respirations BP   37 °C (98.6 °F) 98 18 137/71      Pulse Ox Temp src Heart Rate Source Patient Position   98 % -- -- --      BP Location FiO2 (%)     -- --       Physical Exam  Constitutional:       Appearance: Normal appearance.   HENT:      Head: Normocephalic and atraumatic.      Right Ear: Tympanic membrane normal.      Left Ear: Tympanic membrane normal.      Nose: Nose normal.      Mouth/Throat:      Mouth: Mucous membranes are moist.   Eyes:      Extraocular Movements: Extraocular movements intact.      Pupils: Pupils are equal, round, and reactive to light.   Cardiovascular:      Rate and Rhythm: Normal rate and regular rhythm.      Pulses: Normal pulses.      Heart sounds: Normal heart sounds.   Pulmonary:      Effort: Pulmonary effort is normal.      Breath sounds: Normal breath sounds.   Abdominal:      General:  "Abdomen is flat.      Palpations: Abdomen is soft.   Musculoskeletal:         General: Normal range of motion.      Cervical back: Normal range of motion and neck supple.   Skin:     General: Skin is warm.      Capillary Refill: Capillary refill takes less than 2 seconds.   Neurological:      General: No focal deficit present.      Mental Status: She is alert and oriented to person, place, and time.   Psychiatric:         Mood and Affect: Mood normal.         Behavior: Behavior normal.           ED Course & MDM   ED Course as of 05/21/25 1320   Wed May 21, 2025   1247 Patient seen individually evaluated.    Patient presented with significant anxiety, stating it started as her thoughts spinning around and around until it got so bad that she was having a full-blown panic attack.  She states this has happened once before but does not remember how bad it was in the past.  She is going through \"a lot\" due to her moving in some major life stressors.  She denies any suicidal homicidal ideation, but notes that this is starting to affect her life.  She denies any chest pain or shortness of breath, now calm and collected, initially was so anxious that she was unable to sit in the bed and unable to change to a gown and could barely sit still.    On exam, patient resting comfortably no acute distress, somnolent from the Ativan provided prior to my evaluation.  Heart lungs clear to auscultation, moving all 4 extremities without difficulty, no obvious rashes or exophthalmos.    Lab work showing an elevated TSH and a normal T4.  Has not been on her thyroid medication recently.  Remainder lab work is unremarkable.    Suspect that patient is having episodes of panic disorder with her lab work looking as good as it does status post thyroidectomy and missing her recent doses of levothyroxine.  Offered psychiatric evaluation and management, agreeable will order this and have them evaluate her for outpatient management and " medications.     [KK]      ED Course User Index  [KK] Naga Reed, DO         Diagnoses as of 25 1320   Noncompliance with medications   Hypothyroidism, unspecified type   Anxiety                 No data recorded     [place] Coma Scale Score: 15 (25 0853 : Rusty Chambers, [facility])                           Medical Decision Making  Patient was shaking and she received 1 mg Ativan.  Basic labs were ordered including a TSH with free T4 if abnormal CBC CMP urinalysis and urine pregnancy.  There was no neurologic deficit remaining physical exam was normal  Patient responded well to Ativan and Atarax.  She also received her first dose of Synthroid.  I refilled her Synthroid.  Her strep was negative.  Her TSH was 31.95 but free T4 was normal.  CBC was normal.  CMP was normal.  Urinalysis was negative for nitrates or leukocytes or ketones.    I ordered and EPAT assessment and I brought the monitor in for patient to speak and patient refused stating that she would rather just have resources and she had another appointment 130.  I ran this by attending and both of us felt we could honor patient's request she received information on anxiety and resources and was safely discharged home with careful return precautions.  Vistaril was sent to her pharmacy.  Synthroid was sent to her pharmacy.    Amount and/or Complexity of Data Reviewed  External Data Reviewed: labs.        Procedure  Procedures         [1]   Past Medical History:  Diagnosis Date    Encounter for elective termination of pregnancy 2019    , legal    Personal history of other complications of pregnancy, childbirth and the puerperium 2019    History of spontaneous     Personal history of other infectious and parasitic diseases 2021    History of viral gastroenteritis   [2]   Past Surgical History:  Procedure Laterality Date    OTHER SURGICAL HISTORY  2022    Tooth extraction    THYROIDECTOMY     [3]   Family  History  Problem Relation Name Age of Onset    Goiter Mother      Diabetes Father      Coronary artery disease Father      Breast cancer Maternal Grandmother      Colon cancer Maternal Grandfather     [4]   Social History  Tobacco Use    Smoking status: Every Day     Types: Cigarettes    Smokeless tobacco: Never   Vaping Use    Vaping status: Never Used   Substance Use Topics    Alcohol use: Yes     Comment: rare    Drug use: Never        Dhruv Vicente, NAYELY-CNP  05/21/25 5467

## 2025-09-12 ENCOUNTER — APPOINTMENT (OUTPATIENT)
Dept: PRIMARY CARE | Facility: CLINIC | Age: 30
End: 2025-09-12
Payer: COMMERCIAL